# Patient Record
Sex: FEMALE | URBAN - METROPOLITAN AREA
[De-identification: names, ages, dates, MRNs, and addresses within clinical notes are randomized per-mention and may not be internally consistent; named-entity substitution may affect disease eponyms.]

---

## 2019-01-03 ENCOUNTER — OFFICE VISIT (OUTPATIENT)
Dept: URBAN - METROPOLITAN AREA CLINIC 32 | Facility: CLINIC | Age: 24
End: 2019-01-03
Payer: COMMERCIAL

## 2019-01-03 DIAGNOSIS — H00.11 CHALAZION RIGHT UPPER EYELID: Primary | Chronic | ICD-10-CM

## 2019-01-03 PROCEDURE — 99203 OFFICE O/P NEW LOW 30 MIN: CPT | Performed by: OPHTHALMOLOGY

## 2019-01-03 ASSESSMENT — INTRAOCULAR PRESSURE
OS: 13
OD: 12

## 2019-01-03 NOTE — IMPRESSION/PLAN
Impression: Chalazion right upper eyelid: H00.11. OD. Condition: new problem addtl w/u needed. Symptoms: will treat with meds. Vision: vision threatening. Plan: Discussed diagnosis in detail with patient. Discussed treatment options with patient. Apply hot compress for 3-5 minutes followed by firm downward massage to upper eyelid. If unimproved surgery may be warranted, obtain authorization in the meantime.   USE SMALLEST CLAMP!!